# Patient Record
Sex: MALE | URBAN - METROPOLITAN AREA
[De-identification: names, ages, dates, MRNs, and addresses within clinical notes are randomized per-mention and may not be internally consistent; named-entity substitution may affect disease eponyms.]

---

## 2021-11-16 ENCOUNTER — TRANSFERRED RECORDS (OUTPATIENT)
Dept: HEALTH INFORMATION MANAGEMENT | Facility: CLINIC | Age: 36
End: 2021-11-16
Payer: COMMERCIAL

## 2021-11-18 ENCOUNTER — MEDICAL CORRESPONDENCE (OUTPATIENT)
Dept: HEALTH INFORMATION MANAGEMENT | Facility: CLINIC | Age: 36
End: 2021-11-18
Payer: COMMERCIAL

## 2021-11-18 ENCOUNTER — TELEPHONE (OUTPATIENT)
Dept: OTOLARYNGOLOGY | Facility: CLINIC | Age: 36
End: 2021-11-18
Payer: COMMERCIAL

## 2021-11-18 ENCOUNTER — TRANSCRIBE ORDERS (OUTPATIENT)
Dept: OTHER | Age: 36
End: 2021-11-18
Payer: COMMERCIAL

## 2021-11-18 DIAGNOSIS — J01.01 ACUTE RECURRENT MAXILLARY SINUSITIS: Primary | ICD-10-CM

## 2021-11-18 NOTE — TELEPHONE ENCOUNTER
[facility] Call Center    Phone Message    May a detailed message be left on voicemail: yes     Reason for Call: Appointment Intake    Referring physicians name: Nuno Jo MD  Clinic name: [facility]  Clinic phone #: [phone]  Clinic fax #: [phone]  Clinic contact name: Iliana Campa  Clinic contact's direct #: [phone]    Diagnosis/Symptom: Acute recurrent maxillary sinusitis [J01.01]    Spoke with patient to schedule from his referral. Patient stated he wants to be seen for Aspirin desensitization and continuous aspirin therapy   He believes Aspirin-exacerbated respiratory disease (AERD) is causing his Asthma and Acute recurrent maxillary sinusitis.  If this is something [facility] can do, he is requesting to schedule with the appropriate provider. If not, he is requesting clinic contact the referring provider to inform and offer alternative location if possible.  Patient found all information off [employer].    Action Taken: Message routed to:  [facility] [[id number]]    Travel Screening: Not Applicable

## 2021-11-19 NOTE — TELEPHONE ENCOUNTER
FUTURE VISIT INFORMATION      FUTURE VISIT INFORMATION:    Date: 1/7/22    Time: 3 PM    [place] [facility]  REFERRAL INFORMATION:    Referring provider: Dr. Nuno Jo    Referring providers clinic: [place]    Reason for visit/diagnosis: Maxillary Sinusitis    RECORDS REQUESTED FROM:       Clinic name Comments Records Status Imaging Status   [place] 11/16/21 - ENT OV with Dr. Jo  11/12/21 - PCC OV with Florencia Leroy NP  10/19/20 - PCC OV with Aubree Bull NP Care Everywhere    [place] - Imaging 11/5/20 - CT Head Care Everywhere 11/19 Req - In PACs                       * 11/19/21 1:24 AM Faxed req to [place] for images to be pushed to [place] PACs. - Divya

## 2022-01-07 ENCOUNTER — PRE VISIT (OUTPATIENT)
Dept: OTOLARYNGOLOGY | Facility: CLINIC | Age: 37
End: 2022-01-07

## 2023-04-11 NOTE — TELEPHONE ENCOUNTER
Patient notified, she will call tomorrow to schedule with Dr Culp   Records Requested  11/18/21    Facility  [facility]   Outcome Pulled in recs from Care everywhere      REFERRAL REQUEST  Submitted Thursday November 18th, 2021 @ 2:43 pm    REFERRING PHYSICIAN INFORMATION    Consult or referral?: Referral  Referring physicians name: Nuno Jo MD  Clinic name: [facility]  UPIN: [id number]  Clinic address: [address] [place] [address] [address]  Clinic city: [facility] state: [place]  Clinic zip code: [address]  Clinic phone #: [phone]  Clinic fax #: [phone]  Clinic contact name: Iliana Campa  Clinic contact's direct #: [phone]    PATIENT INFORMATION    Patient's gender: Male  Patient's first name: Marcos  Patient's middle name: Grover  Patient's last name: Hanna  Patient's address: [address] [place] [address]  Patient's city: [place]  Patient's state: [place]  Patient's zip code: [address]  Patient's country: [place]  Patient's YOB: 1985  Patient's name if a minor: [empty]  Patient's spouses name: [empty]  Patient's previous name (if any): [empty]  Patient's mobile phone #: [phone]  Patient's home phone #: [empty]  Patient's work phone #: [empty]  Patient's contact instructions: [empty]    REQUESTED APPOINTMENT    Reason for appointment: Acute recurrent maxillary sinusitis  Speciality requested: Otolaryngology  Pertinent prior surgery or testing: [empty]  Onset / Duration: [empty]  Physician requested (if any): Dr. Keyur Whyte    INSURANCE INFORMATION    Policy paulson: self  Group #: [id number]  ID #: [id number]  Insurance company: [employer]